# Patient Record
Sex: MALE | Race: WHITE
[De-identification: names, ages, dates, MRNs, and addresses within clinical notes are randomized per-mention and may not be internally consistent; named-entity substitution may affect disease eponyms.]

---

## 2019-10-24 ENCOUNTER — HOSPITAL ENCOUNTER (OUTPATIENT)
Dept: HOSPITAL 88 - RAD | Age: 64
End: 2019-10-24
Attending: INTERNAL MEDICINE
Payer: MEDICARE

## 2019-10-24 DIAGNOSIS — R07.9: Primary | ICD-10-CM

## 2019-10-24 PROCEDURE — 93306 TTE W/DOPPLER COMPLETE: CPT

## 2020-11-16 ENCOUNTER — HOSPITAL ENCOUNTER (OUTPATIENT)
Dept: HOSPITAL 88 - NM | Age: 65
End: 2020-11-16
Attending: INTERNAL MEDICINE
Payer: MEDICARE

## 2020-11-16 DIAGNOSIS — E05.90: Primary | ICD-10-CM

## 2020-11-16 PROCEDURE — 78014 THYROID IMAGING W/BLOOD FLOW: CPT

## 2020-11-17 NOTE — DIAGNOSTIC IMAGING REPORT
Thyroid Scan with Single Uptake



Reason for exam: E05.90 Thyrotoxicosis, unspecified without thyrotoxic storm



Radiopharmaceutical: I-123 Eli 0.27 mCi



Report: After oral administration of I-123 Eli, the 6-hour thyroid uptake of

iodine is 44% (normal 4-14%).



Images of the thyroid was obtained in the anterior and anterior oblique

projections.  The thyroid lobes appear symmetric in size. The thyroid is

generally enlarged.  There is homogeneous distribution of tracer activity

throughout both lobes. No focal areas of increased or decreased tracer activity

are identified within the thyroid lobes or isthmus. The thyroid is in a normal

anatomic location. A pyramidal lobe is not seen. There is no aberrant 

functioning thyroid tissue seen in the area scanned. 



Impression: 



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  No scan

evidence of nodular thyroid disease.



Signed by: Dr. Marianna Pickett M.D. on 11/17/2020 4:23 PM